# Patient Record
Sex: FEMALE | Race: WHITE | NOT HISPANIC OR LATINO | Employment: OTHER | URBAN - METROPOLITAN AREA
[De-identification: names, ages, dates, MRNs, and addresses within clinical notes are randomized per-mention and may not be internally consistent; named-entity substitution may affect disease eponyms.]

---

## 2019-09-23 ENCOUNTER — TELEPHONE (OUTPATIENT)
Dept: BARIATRICS/WEIGHT MGMT | Facility: CLINIC | Age: 69
End: 2019-09-23

## 2019-09-23 NOTE — TELEPHONE ENCOUNTER
BBSA records printed and placed in your inbox. I am awaiting the ER report to be faxed over from [facility]. [facility] for pt to return my call.

## 2019-09-23 NOTE — TELEPHONE ENCOUNTER
----- Message from Cara Petersen MA sent at 9/23/2019  2:59 PM EDT -----    Pt went to the ER and states that her band has slipped. Darcy says first available is in two days does the the pt need to be sooner. I am requesting the ER records.

## 2019-09-25 NOTE — TELEPHONE ENCOUNTER
Pt returned my call notified to schedule a f/up visit when able. Pt was transferred to [facility] to be placed on the schedule.

## 2019-09-25 NOTE — TELEPHONE ENCOUNTER
"Records reviewed:     70 y/o female s/p LAGB APS 12/2008 by Dr. Evans. Band unfilled 11/2013 for N/V/port pain.  Last UGI 11/2015 revealed esophageal dilatation and dysmotility w/ patent lapband channel.  Hx esophageal diverticulum noted, followed by GI (Dr. Enrique, [facility]).  LOV w/ BBSA 3/2016 - band remained empty, asymptomatic.    ER eval - [facility] - 9/21/19.  c/o several day hx lower abd pain w/ N/V/D, concern for possible recurrent diverticulitis.  Suprapubic TTP noted on exam.  AVSS.  Labs/UA (-).  CT ab/pel IV only contrast - \"gastrostomy tube\" is present, no acute findings.  Bentyl RX given.  Liquid diet and f/up w/ surgeon advised.        "

## 2019-10-28 ENCOUNTER — OFFICE VISIT (OUTPATIENT)
Dept: BARIATRICS/WEIGHT MGMT | Facility: CLINIC | Age: 69
End: 2019-10-28

## 2019-10-28 VITALS
TEMPERATURE: 97.2 F | HEIGHT: 60 IN | SYSTOLIC BLOOD PRESSURE: 118 MMHG | OXYGEN SATURATION: 99 % | WEIGHT: 188.5 LBS | RESPIRATION RATE: 18 BRPM | DIASTOLIC BLOOD PRESSURE: 70 MMHG | BODY MASS INDEX: 37.01 KG/M2 | HEART RATE: 82 BPM

## 2019-10-28 DIAGNOSIS — R13.10 DYSPHAGIA, UNSPECIFIED TYPE: ICD-10-CM

## 2019-10-28 DIAGNOSIS — R10.13 DYSPEPSIA: Primary | ICD-10-CM

## 2019-10-28 DIAGNOSIS — K21.9 GASTROESOPHAGEAL REFLUX DISEASE, ESOPHAGITIS PRESENCE NOT SPECIFIED: ICD-10-CM

## 2019-10-28 DIAGNOSIS — G89.29 CHRONIC ABDOMINAL PAIN: ICD-10-CM

## 2019-10-28 DIAGNOSIS — R10.9 CHRONIC ABDOMINAL PAIN: ICD-10-CM

## 2019-10-28 PROCEDURE — 99204 OFFICE O/P NEW MOD 45 MIN: CPT | Performed by: PHYSICIAN ASSISTANT

## 2019-10-28 RX ORDER — SUCRALFATE 1 G/1
1 TABLET ORAL NIGHTLY
COMMUNITY

## 2019-10-28 RX ORDER — LEVOTHYROXINE SODIUM 0.1 MG/1
100 TABLET ORAL DAILY
COMMUNITY
Start: 2019-09-18

## 2019-10-28 RX ORDER — LEVETIRACETAM 500 MG/1
TABLET ORAL
COMMUNITY
Start: 2019-08-15 | End: 2020-06-30 | Stop reason: DRUGHIGH

## 2019-10-28 RX ORDER — FLUOXETINE HYDROCHLORIDE 40 MG/1
CAPSULE ORAL
COMMUNITY
Start: 2019-09-30 | End: 2020-06-30

## 2019-10-28 RX ORDER — LISINOPRIL AND HYDROCHLOROTHIAZIDE 12.5; 1 MG/1; MG/1
1 TABLET ORAL NIGHTLY
COMMUNITY
Start: 2019-08-22

## 2019-10-28 RX ORDER — PANTOPRAZOLE SODIUM 40 MG/1
40 TABLET, DELAYED RELEASE ORAL DAILY
COMMUNITY
End: 2020-06-30

## 2019-10-28 RX ORDER — EMPAGLIFLOZIN 25 MG/1
25 TABLET, FILM COATED ORAL DAILY
COMMUNITY
Start: 2019-09-30

## 2019-10-28 RX ORDER — CYANOCOBALAMIN 1000 UG/ML
1000 INJECTION, SOLUTION INTRAMUSCULAR; SUBCUTANEOUS
COMMUNITY
Start: 2019-09-18

## 2019-10-28 RX ORDER — GABAPENTIN 800 MG/1
800 TABLET ORAL 2 TIMES DAILY
COMMUNITY
Start: 2019-10-10

## 2019-10-28 RX ORDER — SIMVASTATIN 40 MG
40 TABLET ORAL NIGHTLY
COMMUNITY
Start: 2019-08-22

## 2019-10-28 RX ORDER — METHOCARBAMOL 750 MG/1
750 TABLET, FILM COATED ORAL 3 TIMES DAILY PRN
COMMUNITY
Start: 2019-10-10

## 2019-10-28 RX ORDER — OXYCODONE AND ACETAMINOPHEN 10; 325 MG/1; MG/1
1 TABLET ORAL EVERY 6 HOURS PRN
COMMUNITY
Start: 2019-10-10

## 2019-10-28 NOTE — PROGRESS NOTES
"[facility] Bariatric Surgery  [address]  [facility] [id number] [phone]        Patient Name:  Jazmín Adams.  :  1950      Date of Visit: 10/28/2019      Reason for Visit:  AGB followup    HPI:  Jazmín Adams is a 69 y.o. female s/p LAGB APS 2008 by Dr. Evans.     Aware Dr. Evans is in [place], prefers to continue care here in [place].      Chart reviewed:  Band unfilled 2013 for N/V/port pain.  Last UGI 2015 revealed esophageal dilatation and dysmotility w/ patent lapband channel.  Hx esophageal diverticulum noted, followed by GI (Dr. Enrique, [facility]).  LOV w/ BBSA 3/2016 - band remained empty, asymptomatic.    Patient called our office 19 stating she had gone to the ER and was told her band had slipped.  Records requested/reviewed.  ER eval @[facility]  19 - c/o several day hx lower abd pain w/ N/V/D, concern for possible recurrent diverticulitis.  Suprapubic TTP noted on exam.  AVSS.  Labs/UA (-).  CT ab/pel IV only contrast - \"gastrostomy tube\" is present, no acute findings.  Bentyl RX given.  Liquid diet and f/up w/ surgeon advised.  Patient did not keep scheduled OV.       Presents today w/ ongoing issues.  Wants to discuss having lapband removed w/ possible revision.  Has had chronic dysphagia w/ frequent N/V.  Postprandial LUQ pain w/ associated port pain.  Uncontrolled reflux, throat \"burns\" all day long, despite taking Protonix daily w/ Carafate qPM.  Grandmother and 2 uncles have had throat cancer which really worries her.       Past Medical History:   Diagnosis Date   • Anxiety and depression    • Chronic back pain    • Dyspepsia    • Esophageal diverticulum     followed by GI ([facility])   • Fatigue    • Hyperlipidemia    • Hypertension    • Hypothyroidism    • IBS (irritable bowel syndrome)    • Obesity    • RLS (restless legs syndrome)    • Type 2 diabetes mellitus (CMS/HCC)      Past Surgical " "History:   Procedure Laterality Date   • BLADDER SUSPENSION      x2   • CHOLECYSTECTOMY OPEN     • LAPAROSCOPIC GASTRIC BANDING  2008    s/p LAGB APS 12/2008 w/ Dr. Evans   • LAPAROSCOPIC HYSTERECTOMY     • TUBAL ABDOMINAL LIGATION         Allergies   Allergen Reactions   • Sulfa Antibiotics Palpitations       Outpatient Medications Marked as Taking for the 10/28/19 encounter (Office Visit) with Jacklyn Putnam PA   Medication Sig Dispense Refill   • cyanocobalamin 1000 MCG/ML injection      • diclofenac (VOLTAREN) 1 % gel gel      • FLUoxetine (PROzac) 40 MG capsule      • gabapentin (NEURONTIN) 800 MG tablet      • JARDIANCE 25 MG tablet      • levETIRAcetam (KEPPRA) 500 MG tablet      • levothyroxine (SYNTHROID, LEVOTHROID) 100 MCG tablet      • lisinopril-hydrochlorothiazide (PRINZIDE,ZESTORETIC) 10-12.5 MG per tablet      • metFORMIN (GLUCOPHAGE) 500 MG tablet Take 500 mg by mouth 2 (Two) Times a Day With Meals.     • methocarbamol (ROBAXIN) 750 MG tablet      • oxyCODONE-acetaminophen (PERCOCET)  MG per tablet      • pantoprazole (PROTONIX) 40 MG EC tablet Take 40 mg by mouth Daily.     • simvastatin (ZOCOR) 40 MG tablet      • sucralfate (CARAFATE) 1 g tablet Take 1 g by mouth Every Night.         Social History     Socioeconomic History   • Marital status:      Spouse name: Not on file   • Number of children: Not on file   • Years of education: Not on file   • Highest education level: Not on file         /70 (BP Location: Left arm, Patient Position: Sitting, Cuff Size: Large Adult)   Pulse 82   Temp 97.2 °F (36.2 °C) (Temporal)   Resp 18   Ht 152.4 cm (60\")   Wt 85.5 kg (188 lb 8 oz)   SpO2 99%   BMI 36.81 kg/m²     Physical Exam   Constitutional: She appears well-developed and well-nourished. She is cooperative.   HENT:   Mouth/Throat: Oropharynx is clear and moist and mucous membranes are normal.   Eyes: Conjunctivae are normal. No scleral icterus.   Cardiovascular: Normal " rate.   Pulmonary/Chest: Effort normal.   Abdominal: Soft. There is no tenderness.   LUQ port - site unremarkable   Musculoskeletal: Normal range of motion. She exhibits no edema.   Neurological: She is alert.   Skin: Skin is warm and dry. No rash noted.   Psychiatric: She has a normal mood and affect. Judgment normal.       Assessment: s/p LAGB APS 12/2008 by Dr. Evans    ICD-10-CM ICD-9-CM   1. Dyspepsia R10.13 536.8   2. Dysphagia, unspecified type R13.10 787.20   3. Gastroesophageal reflux disease, esophagitis presence not specified K21.9 530.81   4. Chronic abdominal pain R10.9 789.00    G89.29 338.29       Plan:  UGI ordered to further evaluate.  Will request most recent EGD w/ Dr. Enrique.  Further input to follow.     Addendum:  Last EGD 7/16/18 w/ Dr. Enrique @ [facility] - mild esophagitis and 2 small antral ulcerations noted, bx negative for H.Pylori.         ANGELA Avila

## 2020-01-10 ENCOUNTER — HOSPITAL ENCOUNTER (OUTPATIENT)
Dept: GENERAL RADIOLOGY | Facility: HOSPITAL | Age: 70
Discharge: HOME | End: 2020-01-10
Admitting: PHYSICIAN ASSISTANT

## 2020-01-10 DIAGNOSIS — R13.10 DYSPHAGIA, UNSPECIFIED TYPE: ICD-10-CM

## 2020-01-10 DIAGNOSIS — K21.9 GASTROESOPHAGEAL REFLUX DISEASE, ESOPHAGITIS PRESENCE NOT SPECIFIED: ICD-10-CM

## 2020-01-10 DIAGNOSIS — R10.13 DYSPEPSIA: ICD-10-CM

## 2020-01-10 PROCEDURE — 63710000001 BARIUM SULFATE 96 % RECONSTITUTED SUSPENSION: Performed by: PHYSICIAN ASSISTANT

## 2020-01-10 PROCEDURE — 74240 X-RAY XM UPR GI TRC 1CNTRST: CPT

## 2020-01-10 PROCEDURE — A9270 NON-COVERED ITEM OR SERVICE: HCPCS | Performed by: PHYSICIAN ASSISTANT

## 2020-01-10 RX ADMIN — BARIUM SULFATE 183 ML: 960 POWDER, FOR SUSPENSION ORAL at 09:57

## 2020-03-05 ENCOUNTER — OFFICE VISIT (OUTPATIENT)
Dept: BARIATRICS/WEIGHT MGMT | Facility: CLINIC | Age: 70
End: 2020-03-05

## 2020-03-05 VITALS
DIASTOLIC BLOOD PRESSURE: 88 MMHG | HEART RATE: 97 BPM | TEMPERATURE: 96.4 F | SYSTOLIC BLOOD PRESSURE: 144 MMHG | WEIGHT: 193.5 LBS | HEIGHT: 60 IN | RESPIRATION RATE: 18 BRPM | BODY MASS INDEX: 37.99 KG/M2 | OXYGEN SATURATION: 97 %

## 2020-03-05 DIAGNOSIS — K21.9 GASTROESOPHAGEAL REFLUX DISEASE, ESOPHAGITIS PRESENCE NOT SPECIFIED: Primary | ICD-10-CM

## 2020-03-05 DIAGNOSIS — K95.09 COMPLICATION OF GASTRIC BAND PROCEDURE: ICD-10-CM

## 2020-03-05 DIAGNOSIS — R13.10 DYSPHAGIA, UNSPECIFIED TYPE: ICD-10-CM

## 2020-03-05 PROCEDURE — 99214 OFFICE O/P EST MOD 30 MIN: CPT | Performed by: SURGERY

## 2020-03-05 NOTE — PROGRESS NOTES
"[facility] Bariatric Surgery  [address]  [address]  [facility] [id number] [phone]        Patient Name: Jazmín Adams.  YOB: 1950      Date of Visit: 03/05/2020      Reason for Visit:  Dysphagia, intolerance of adjustable gastric band    HPI:  Jazmín Adams is a 69 y.o. female s/p \" LAGB APS 12/2008 by Dr. Evans.     Aware Dr. Evans is in [place], prefers to continue care here in [place].      Chart reviewed:  Band unfilled 11/2013 for N/V/port pain.  Last UGI 11/2015 revealed esophageal dilatation and dysmotility w/ patent lapband channel.  Hx esophageal diverticulum noted, followed by GI (Dr. Enrique, [facility]).  LOV w/ BBSA 3/2016 - band remained empty, asymptomatic.    Patient called our office 9/23/19 stating she had gone to the ER and was told her band had slipped.  Records requested/reviewed.  ER eval @[facility]  9/21/19 - c/o several day hx lower abd pain w/ N/V/D, concern for possible recurrent diverticulitis.  Suprapubic TTP noted on exam.  AVSS.  Labs/UA (-).  CT ab/pel IV only contrast - \"gastrostomy tube\" is present, no acute findings.  Bentyl RX given.  Liquid diet and f/up w/ surgeon advised.  Patient did not keep scheduled OV.       Presents today w/ ongoing issues.  Wants to discuss having lapband removed w/ possible revision.  Has had chronic dysphagia w/ frequent N/V.  Postprandial LUQ pain w/ associated port pain.  Uncontrolled reflux, throat \"burns\" all day long, despite taking Protonix daily w/ Carafate qPM.  Grandmother and 2 uncles have had throat cancer which really worries her. \"    Today's update:    10/28/19 UGI:  IMPRESSION:  1. Status post gastric lap band. LAP-BAND appears to be in the  appropriate position and orientation. LAP-BAND channel is narrow, and  patent  2. Small sized Zenker's diverticulum  3. Small sized esophageal diverticulum on the rightward aspect of the  midesophagus  4. Large sized duodenal diverticulum " with a large intraluminal filling  defect. Further evaluation may be considered if clinically relevant.         Images reviewed by me.  Patent band channel.  Appropriate band angle.  Lateral pouch enlargement, and esophageal ectasia, indicating band has been too tight for a long period of time.    C/o acid reflux.  +Dysphagia, and hoarse voice which she attributes to acid reflux.  She is on pantoprazole which somewhat helps her symptoms.  Was off PPI for a month due to insurance issues. +port tenderness    Had EGD with Dr. Enrique 3/2/20: findings of erosive esophagitis at GE junction, erosive gastritis at antrum.  Lap band outline visible, but no erosion seen in th  retroflexed view of the pictures she brought me.      She has gained some weight, and attributes this to eating bread and crackers to try and soothe the acid reflux.  She is interested in revision options if possible.        Past Medical History:   Diagnosis Date   • Anxiety and depression    • Chronic back pain    • Dyspepsia    • Esophageal diverticulum     followed by GI (Dr. Enrique, [facility])   • Fatigue    • Hyperlipidemia    • Hypertension    • Hypothyroidism    • IBS (irritable bowel syndrome)    • Obesity    • RLS (restless legs syndrome)    • Type 2 diabetes mellitus (CMS/HCC)      Past Surgical History:   Procedure Laterality Date   • BLADDER SUSPENSION      x2   • CHOLECYSTECTOMY OPEN     • LAPAROSCOPIC GASTRIC BANDING  2008    s/p LAGB APS 12/2008 w/ Dr. Evans   • LAPAROSCOPIC HYSTERECTOMY     • TUBAL ABDOMINAL LIGATION       Outpatient Medications Marked as Taking for the 3/5/20 encounter (Office Visit) with Marixa Pavon MD   Medication Sig Dispense Refill   • cyanocobalamin 1000 MCG/ML injection      • diclofenac (VOLTAREN) 1 % gel gel      • FLUoxetine (PROzac) 40 MG capsule      • gabapentin (NEURONTIN) 800 MG tablet      • JARDIANCE 25 MG tablet      • levETIRAcetam (KEPPRA) 500 MG tablet      • levothyroxine  (SYNTHROID, LEVOTHROID) 100 MCG tablet      • lisinopril-hydrochlorothiazide (PRINZIDE,ZESTORETIC) 10-12.5 MG per tablet      • metFORMIN (GLUCOPHAGE) 500 MG tablet Take 500 mg by mouth 2 (Two) Times a Day With Meals.     • methocarbamol (ROBAXIN) 750 MG tablet      • oxyCODONE-acetaminophen (PERCOCET)  MG per tablet      • pantoprazole (PROTONIX) 40 MG EC tablet Take 40 mg by mouth Daily.     • simvastatin (ZOCOR) 40 MG tablet      • sucralfate (CARAFATE) 1 g tablet Take 1 g by mouth Every Night.       Allergies   Allergen Reactions   • Sulfa Antibiotics Palpitations       Social History     Socioeconomic History   • Marital status:      Spouse name: Not on file   • Number of children: Not on file   • Years of education: Not on file   • Highest education level: Not on file       Vitals:    03/05/20 1253   BP: 144/88   Pulse: 97   Resp: 18   Temp: 96.4 °F (35.8 °C)   SpO2: 97%     Weight 87.8 kg (193 lb 8 oz)  Body mass index is 37.79 kg/m².    Physical Exam   Constitutional: She is oriented to person, place, and time. She appears well-developed and well-nourished. No distress.   HENT:   Head: Normocephalic and atraumatic.   Mouth/Throat: No oropharyngeal exudate.   Eyes: Pupils are equal, round, and reactive to light. Conjunctivae and EOM are normal.   Pulmonary/Chest: Effort normal. No respiratory distress.   Abdominal: Soft. She exhibits no distension.       Neurological: She is alert and oriented to person, place, and time. No cranial nerve deficit.   Skin: Skin is warm and dry. She is not diaphoretic. No pallor.   Psychiatric: She has a normal mood and affect. Her behavior is normal. Thought content normal.         Assessment:      ICD-10-CM ICD-9-CM   1. Gastroesophageal reflux disease, esophagitis presence not specified K21.9 530.81   2. Dysphagia, unspecified type R13.10 787.20   3. Complication of gastric band procedure K95.09 539.09       Plan:  Laparoscopic Lapband Removal w/ Dr. Martinez.   Avoid ASA/NSAIDS x 1 week prior.  Potential risk of bleeding, infection, injury to surrounding structures, pulm complications, venothromboembolic events, anesthesia reaction discussed with patient.  All questions answered - wishes to proceed.     Will need cardiac clearance prior to procedure given age, hx of HTN/HLD/DM2, and procedure must be done at [facility], not [facility], given comorbidities and higher ASA class.    ADDENDUM: Cardiac clearance received.  Will schedule case.

## 2020-03-09 ENCOUNTER — TELEPHONE (OUTPATIENT)
Dept: BARIATRICS/WEIGHT MGMT | Facility: CLINIC | Age: 70
End: 2020-03-09

## 2020-03-09 RX ORDER — SODIUM CHLORIDE 9 MG/ML
150 INJECTION, SOLUTION INTRAVENOUS CONTINUOUS
Status: CANCELLED | OUTPATIENT
Start: 2020-03-09

## 2020-03-09 NOTE — TELEPHONE ENCOUNTER
----- Message from Marixa Pavon MD sent at 3/9/2020 10:31 AM EDT -----  Please let her know Dr. Martinez will take out the band but I needs cardiology clearance to be sent to us first.  Does she need referral to  cards or does she already have one?  I'll wait to schedule procedure until I see the cards clearance.      ----- Message -----  From: Torsten Martinez MD  Sent: 3/5/2020   6:38 PM EDT  To: Marixa Pavon MD    OK to sched AGBR without repeat EGD, thanks!  ----- Message -----  From: Marixa Pavon MD  Sent: 3/5/2020   4:32 PM EST  To: Torsten Martinez MD    LAGB APS 12/2008 by Dr. Evans.      Has chronic dysphagia, port pain, GERD, n/v.        Had EGD with Dr. Enrique in [place] on 3/2/20: findings of erosive esophagitis at GE junction, erosive gastritis at antrum.  Lap band outline visible, but no erosion seen in the retroflexed view on the pictures she brought me.          10/28/19 UGI:  IMPRESSION:  1. Status post gastric lap band. LAP-BAND appears to be in the  appropriate position and orientation. LAP-BAND channel is narrow, and  patent  2. Small sized Zenker's diverticulum  3. Small sized esophageal diverticulum on the rightward aspect of the  midesophagus  4. Large sized duodenal diverticulum with a large intraluminal filling  defect. Further evaluation may be considered if clinically relevant.         Images reviewed by me.  Patent band channel.  Appropriate band angle.  Lateral pouch enlargement, and esophageal ectasia.        Desires band removal, possibly later sleeve.      Can I schedule for band removal (would do at main OR, and I'm getting cards clearance first b/c of age and comorbidities)    OR     Do you want to scope her yourself?

## 2020-03-09 NOTE — TELEPHONE ENCOUNTER
Contacted pt to let her know that Dr. Martinez will remove her Lapband, but she will need cardic clearance prior to surgery. Pt verbalized understanding. I asked pt if she needs a referral to [facility] or does she already have a cardiologist? Pt stated that she has a cardiologist already, and will have a clearance faxed to our office. I provided pt with our fax number. I let pt know that once we receive clearance and insurance approval, we will schedule her procedure.     Dr. Pavon,  Please refer message back to [facility] for insurance approval for band removal w/ the need of pts Cardiac clearance, which has been requested.      Thank you,  Gale

## 2020-03-11 PROBLEM — K21.9 GASTROESOPHAGEAL REFLUX DISEASE: Status: ACTIVE | Noted: 2020-03-11

## 2020-03-11 PROBLEM — K95.09 COMPLICATION OF GASTRIC BAND PROCEDURE: Status: ACTIVE | Noted: 2020-03-11

## 2020-03-11 PROBLEM — R13.10 DYSPHAGIA: Status: ACTIVE | Noted: 2020-03-11

## 2020-04-02 ENCOUNTER — APPOINTMENT (OUTPATIENT)
Dept: PREADMISSION TESTING | Facility: HOSPITAL | Age: 70
End: 2020-04-02

## 2020-06-30 ENCOUNTER — OFFICE VISIT (OUTPATIENT)
Dept: BARIATRICS/WEIGHT MGMT | Facility: CLINIC | Age: 70
End: 2020-06-30

## 2020-06-30 ENCOUNTER — APPOINTMENT (OUTPATIENT)
Dept: PREADMISSION TESTING | Facility: HOSPITAL | Age: 70
End: 2020-06-30

## 2020-06-30 VITALS
HEIGHT: 60 IN | WEIGHT: 193.5 LBS | RESPIRATION RATE: 14 BRPM | BODY MASS INDEX: 37.99 KG/M2 | SYSTOLIC BLOOD PRESSURE: 142 MMHG | DIASTOLIC BLOOD PRESSURE: 80 MMHG | TEMPERATURE: 96.9 F | HEART RATE: 77 BPM

## 2020-06-30 DIAGNOSIS — Z98.84 STATUS POST BARIATRIC SURGERY: ICD-10-CM

## 2020-06-30 DIAGNOSIS — R13.10 DYSPHAGIA, UNSPECIFIED TYPE: ICD-10-CM

## 2020-06-30 DIAGNOSIS — R10.9 ABDOMINAL PAIN, UNSPECIFIED ABDOMINAL LOCATION: ICD-10-CM

## 2020-06-30 DIAGNOSIS — R11.10 VOMITING, INTRACTABILITY OF VOMITING NOT SPECIFIED, PRESENCE OF NAUSEA NOT SPECIFIED, UNSPECIFIED VOMITING TYPE: ICD-10-CM

## 2020-06-30 DIAGNOSIS — R11.0 NAUSEA: ICD-10-CM

## 2020-06-30 DIAGNOSIS — E66.9 OBESITY, CLASS II, BMI 35-39.9: Primary | ICD-10-CM

## 2020-06-30 LAB
DEPRECATED RDW RBC AUTO: 46.4 FL (ref 37–54)
ERYTHROCYTE [DISTWIDTH] IN BLOOD BY AUTOMATED COUNT: 13 % (ref 12.3–15.4)
HCT VFR BLD AUTO: 40.1 % (ref 34–46.6)
HGB BLD-MCNC: 13.1 G/DL (ref 12–15.9)
MCH RBC QN AUTO: 31.9 PG (ref 26.6–33)
MCHC RBC AUTO-ENTMCNC: 32.7 G/DL (ref 31.5–35.7)
MCV RBC AUTO: 97.6 FL (ref 79–97)
PLATELET # BLD AUTO: 239 10*3/MM3 (ref 140–450)
PMV BLD AUTO: 10.1 FL (ref 6–12)
POTASSIUM SERPL-SCNC: 4.2 MMOL/L (ref 3.5–5.2)
RBC # BLD AUTO: 4.11 10*6/MM3 (ref 3.77–5.28)
WBC # BLD AUTO: 7.36 10*3/MM3 (ref 3.4–10.8)

## 2020-06-30 PROCEDURE — 84132 ASSAY OF SERUM POTASSIUM: CPT | Performed by: SURGERY

## 2020-06-30 PROCEDURE — 36415 COLL VENOUS BLD VENIPUNCTURE: CPT

## 2020-06-30 PROCEDURE — C9803 HOPD COVID-19 SPEC COLLECT: HCPCS

## 2020-06-30 PROCEDURE — 99214 OFFICE O/P EST MOD 30 MIN: CPT | Performed by: PHYSICIAN ASSISTANT

## 2020-06-30 PROCEDURE — 93010 ELECTROCARDIOGRAM REPORT: CPT | Performed by: INTERNAL MEDICINE

## 2020-06-30 PROCEDURE — U0004 COV-19 TEST NON-CDC HGH THRU: HCPCS

## 2020-06-30 PROCEDURE — 85027 COMPLETE CBC AUTOMATED: CPT | Performed by: SURGERY

## 2020-06-30 PROCEDURE — U0002 COVID-19 LAB TEST NON-CDC: HCPCS

## 2020-06-30 PROCEDURE — 93005 ELECTROCARDIOGRAM TRACING: CPT

## 2020-06-30 RX ORDER — ALBUTEROL SULFATE 90 UG/1
2 AEROSOL, METERED RESPIRATORY (INHALATION) EVERY 4 HOURS PRN
COMMUNITY

## 2020-06-30 RX ORDER — LEVETIRACETAM 750 MG/1
750 TABLET ORAL 2 TIMES DAILY
COMMUNITY
Start: 2020-04-06
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
## 2020-07-01 LAB
REF LAB TEST METHOD: NORMAL
SARS-COV-2 RNA RESP QL NAA+PROBE: NOT DETECTED

## 2020-07-02 ENCOUNTER — ANESTHESIA EVENT (OUTPATIENT)
Dept: PERIOP | Facility: HOSPITAL | Age: 70
End: 2020-07-02

## 2020-07-02 ENCOUNTER — HOSPITAL ENCOUNTER (OUTPATIENT)
Facility: HOSPITAL | Age: 70
Discharge: HOME | End: 2020-07-02
Attending: SURGERY | Admitting: SURGERY

## 2020-07-02 ENCOUNTER — ANESTHESIA (OUTPATIENT)
Dept: PERIOP | Facility: HOSPITAL | Age: 70
End: 2020-07-02

## 2020-07-02 VITALS
RESPIRATION RATE: 18 BRPM | OXYGEN SATURATION: 96 % | TEMPERATURE: 98 F | SYSTOLIC BLOOD PRESSURE: 128 MMHG | HEART RATE: 66 BPM | DIASTOLIC BLOOD PRESSURE: 55 MMHG

## 2020-07-02 DIAGNOSIS — K95.09 COMPLICATION OF GASTRIC BAND PROCEDURE: ICD-10-CM

## 2020-07-02 DIAGNOSIS — K21.9 GASTROESOPHAGEAL REFLUX DISEASE, ESOPHAGITIS PRESENCE NOT SPECIFIED: ICD-10-CM

## 2020-07-02 DIAGNOSIS — R13.10 DYSPHAGIA, UNSPECIFIED TYPE: ICD-10-CM

## 2020-07-02 LAB
GLUCOSE BLDC GLUCOMTR-MCNC: 147 MG/DL (ref 70–130)
GLUCOSE BLDC GLUCOMTR-MCNC: 217 MG/DL (ref 70–130)

## 2020-07-02 PROCEDURE — 88313 SPECIAL STAINS GROUP 2: CPT | Performed by: SURGERY

## 2020-07-02 PROCEDURE — 25010000002 HYDROMORPHONE PER 4 MG: Performed by: NURSE ANESTHETIST, CERTIFIED REGISTERED

## 2020-07-02 PROCEDURE — 25010000002 ENOXAPARIN PER 10 MG: Performed by: SURGERY

## 2020-07-02 PROCEDURE — 25010000002 FENTANYL CITRATE (PF) 100 MCG/2ML SOLUTION: Performed by: NURSE ANESTHETIST, CERTIFIED REGISTERED

## 2020-07-02 PROCEDURE — A9270 NON-COVERED ITEM OR SERVICE: HCPCS | Performed by: SURGERY

## 2020-07-02 PROCEDURE — 25010000002 NEOSTIGMINE 10 MG/10ML SOLUTION: Performed by: NURSE ANESTHETIST, CERTIFIED REGISTERED

## 2020-07-02 PROCEDURE — 25010000002 PROPOFOL 10 MG/ML EMULSION: Performed by: NURSE ANESTHETIST, CERTIFIED REGISTERED

## 2020-07-02 PROCEDURE — 82962 GLUCOSE BLOOD TEST: CPT

## 2020-07-02 PROCEDURE — 25010000003 CEFAZOLIN IN DEXTROSE 2-4 GM/100ML-% SOLUTION: Performed by: SURGERY

## 2020-07-02 PROCEDURE — 47001 NDL BIOPSY LVR TM OTH MAJ PX: CPT | Performed by: SURGERY

## 2020-07-02 PROCEDURE — 25010000002 DEXAMETHASONE PER 1 MG: Performed by: NURSE ANESTHETIST, CERTIFIED REGISTERED

## 2020-07-02 PROCEDURE — 25010000002 ONDANSETRON PER 1 MG: Performed by: NURSE ANESTHETIST, CERTIFIED REGISTERED

## 2020-07-02 PROCEDURE — 88307 TISSUE EXAM BY PATHOLOGIST: CPT | Performed by: SURGERY

## 2020-07-02 PROCEDURE — 88300 SURGICAL PATH GROSS: CPT | Performed by: SURGERY

## 2020-07-02 PROCEDURE — 43774 LAP RMVL GASTR ADJ ALL PARTS: CPT | Performed by: SURGERY

## 2020-07-02 PROCEDURE — 63710000001 SIMETHICONE 80 MG CHEWABLE TABLET: Performed by: SURGERY

## 2020-07-02 PROCEDURE — 25010000002 PROMETHAZINE PER 50 MG: Performed by: NURSE ANESTHETIST, CERTIFIED REGISTERED

## 2020-07-02 RX ORDER — SODIUM CHLORIDE 9 MG/ML
INJECTION, SOLUTION INTRAVENOUS AS NEEDED
Status: DISCONTINUED | OUTPATIENT
Start: 2020-07-02 | End: 2020-07-02 | Stop reason: HOSPADM

## 2020-07-02 RX ORDER — SIMETHICONE 80 MG
80 TABLET,CHEWABLE ORAL ONCE
Status: COMPLETED | OUTPATIENT
Start: 2020-07-02 | End: 2020-07-02

## 2020-07-02 RX ORDER — HYDROMORPHONE HYDROCHLORIDE 1 MG/ML
0.5 INJECTION, SOLUTION INTRAMUSCULAR; INTRAVENOUS; SUBCUTANEOUS
Status: DISCONTINUED | OUTPATIENT
Start: 2020-07-02 | End: 2020-07-02 | Stop reason: HOSPADM

## 2020-07-02 RX ORDER — SODIUM CHLORIDE 0.9 % (FLUSH) 0.9 %
10 SYRINGE (ML) INJECTION EVERY 12 HOURS SCHEDULED
Status: DISCONTINUED | OUTPATIENT
Start: 2020-07-02 | End: 2020-07-02 | Stop reason: HOSPADM

## 2020-07-02 RX ORDER — DEXAMETHASONE SODIUM PHOSPHATE 4 MG/ML
INJECTION, SOLUTION INTRA-ARTICULAR; INTRALESIONAL; INTRAMUSCULAR; INTRAVENOUS; SOFT TISSUE AS NEEDED
Status: DISCONTINUED | OUTPATIENT
Start: 2020-07-02 | End: 2020-07-02 | Stop reason: SURG

## 2020-07-02 RX ORDER — FENTANYL CITRATE 50 UG/ML
50 INJECTION, SOLUTION INTRAMUSCULAR; INTRAVENOUS
Status: DISCONTINUED | OUTPATIENT
Start: 2020-07-02 | End: 2020-07-02 | Stop reason: HOSPADM

## 2020-07-02 RX ORDER — SODIUM CHLORIDE 9 MG/ML
150 INJECTION, SOLUTION INTRAVENOUS CONTINUOUS
Status: DISCONTINUED | OUTPATIENT
Start: 2020-07-02 | End: 2020-07-02 | Stop reason: HOSPADM

## 2020-07-02 RX ORDER — CEFAZOLIN SODIUM 2 G/100ML
2 INJECTION, SOLUTION INTRAVENOUS ONCE
Status: COMPLETED | OUTPATIENT
Start: 2020-07-02 | End: 2020-07-02

## 2020-07-02 RX ORDER — ONDANSETRON 2 MG/ML
4 INJECTION INTRAMUSCULAR; INTRAVENOUS ONCE
Status: COMPLETED | OUTPATIENT
Start: 2020-07-02 | End: 2020-07-02

## 2020-07-02 RX ORDER — NEOSTIGMINE METHYLSULFATE 1 MG/ML
INJECTION, SOLUTION INTRAVENOUS AS NEEDED
Status: DISCONTINUED | OUTPATIENT
Start: 2020-07-02 | End: 2020-07-02 | Stop reason: SURG

## 2020-07-02 RX ORDER — BUPIVACAINE HYDROCHLORIDE AND EPINEPHRINE 5; 5 MG/ML; UG/ML
INJECTION, SOLUTION EPIDURAL; INTRACAUDAL; PERINEURAL AS NEEDED
Status: DISCONTINUED | OUTPATIENT
Start: 2020-07-02 | End: 2020-07-02 | Stop reason: HOSPADM

## 2020-07-02 RX ORDER — ROCURONIUM BROMIDE 10 MG/ML
INJECTION, SOLUTION INTRAVENOUS AS NEEDED
Status: DISCONTINUED | OUTPATIENT
Start: 2020-07-02 | End: 2020-07-02 | Stop reason: SURG

## 2020-07-02 RX ORDER — PROMETHAZINE HYDROCHLORIDE 25 MG/ML
12.5 INJECTION, SOLUTION INTRAMUSCULAR; INTRAVENOUS
Status: DISCONTINUED | OUTPATIENT
Start: 2020-07-02 | End: 2020-07-02 | Stop reason: HOSPADM

## 2020-07-02 RX ORDER — SODIUM CHLORIDE, SODIUM LACTATE, POTASSIUM CHLORIDE, CALCIUM CHLORIDE 600; 310; 30; 20 MG/100ML; MG/100ML; MG/100ML; MG/100ML
INJECTION, SOLUTION INTRAVENOUS CONTINUOUS PRN
Status: DISCONTINUED | OUTPATIENT
Start: 2020-07-02 | End: 2020-07-02 | Stop reason: SURG

## 2020-07-02 RX ORDER — SODIUM CHLORIDE 0.9 % (FLUSH) 0.9 %
10 SYRINGE (ML) INJECTION AS NEEDED
Status: DISCONTINUED | OUTPATIENT
Start: 2020-07-02 | End: 2020-07-02 | Stop reason: HOSPADM

## 2020-07-02 RX ORDER — SODIUM CHLORIDE, SODIUM LACTATE, POTASSIUM CHLORIDE, CALCIUM CHLORIDE 600; 310; 30; 20 MG/100ML; MG/100ML; MG/100ML; MG/100ML
9 INJECTION, SOLUTION INTRAVENOUS CONTINUOUS
Status: CANCELLED | OUTPATIENT
Start: 2020-07-02

## 2020-07-02 RX ORDER — FAMOTIDINE 20 MG/1
20 TABLET, FILM COATED ORAL ONCE
Status: COMPLETED | OUTPATIENT
Start: 2020-07-02 | End: 2020-07-02

## 2020-07-02 RX ORDER — HYDROCODONE BITARTRATE AND ACETAMINOPHEN 7.5; 325 MG/1; MG/1
1 TABLET ORAL EVERY 4 HOURS PRN
Qty: 6 TABLET | Refills: 0 | Status: SHIPPED | OUTPATIENT
Start: 2020-07-02 | End: 2020-07-12

## 2020-07-02 RX ORDER — GLYCOPYRROLATE 0.2 MG/ML
INJECTION INTRAMUSCULAR; INTRAVENOUS AS NEEDED
Status: DISCONTINUED | OUTPATIENT
Start: 2020-07-02 | End: 2020-07-02 | Stop reason: SURG

## 2020-07-02 RX ORDER — MAGNESIUM HYDROXIDE 1200 MG/15ML
LIQUID ORAL AS NEEDED
Status: DISCONTINUED | OUTPATIENT
Start: 2020-07-02 | End: 2020-07-02 | Stop reason: HOSPADM

## 2020-07-02 RX ORDER — LIDOCAINE HYDROCHLORIDE 10 MG/ML
INJECTION, SOLUTION EPIDURAL; INFILTRATION; INTRACAUDAL; PERINEURAL AS NEEDED
Status: DISCONTINUED | OUTPATIENT
Start: 2020-07-02 | End: 2020-07-02 | Stop reason: SURG

## 2020-07-02 RX ORDER — FENTANYL CITRATE 50 UG/ML
INJECTION, SOLUTION INTRAMUSCULAR; INTRAVENOUS AS NEEDED
Status: DISCONTINUED | OUTPATIENT
Start: 2020-07-02 | End: 2020-07-02 | Stop reason: SURG

## 2020-07-02 RX ORDER — ONDANSETRON 2 MG/ML
INJECTION INTRAMUSCULAR; INTRAVENOUS AS NEEDED
Status: DISCONTINUED | OUTPATIENT
Start: 2020-07-02 | End: 2020-07-02 | Stop reason: SURG

## 2020-07-02 RX ORDER — FAMOTIDINE 10 MG/ML
20 INJECTION, SOLUTION INTRAVENOUS ONCE
Status: CANCELLED | OUTPATIENT
Start: 2020-07-02 | End: 2020-07-02

## 2020-07-02 RX ORDER — LIDOCAINE HYDROCHLORIDE 10 MG/ML
0.5 INJECTION, SOLUTION EPIDURAL; INFILTRATION; INTRACAUDAL; PERINEURAL ONCE AS NEEDED
Status: COMPLETED | OUTPATIENT
Start: 2020-07-02 | End: 2020-07-02

## 2020-07-02 RX ORDER — PROPOFOL 10 MG/ML
VIAL (ML) INTRAVENOUS AS NEEDED
Status: DISCONTINUED | OUTPATIENT
Start: 2020-07-02 | End: 2020-07-02 | Stop reason: SURG

## 2020-07-02 RX ADMIN — FAMOTIDINE 20 MG: 20 TABLET, FILM COATED ORAL at 06:47

## 2020-07-02 RX ADMIN — ROCURONIUM BROMIDE 40 MG: 10 INJECTION INTRAVENOUS at 07:32

## 2020-07-02 RX ADMIN — PROPOFOL 150 MG: 10 INJECTION, EMULSION INTRAVENOUS at 07:32

## 2020-07-02 RX ADMIN — FENTANYL CITRATE 50 MCG: 50 INJECTION, SOLUTION INTRAMUSCULAR; INTRAVENOUS at 07:32

## 2020-07-02 RX ADMIN — SIMETHICONE CHEW TAB 80 MG 80 MG: 80 TABLET ORAL at 10:23

## 2020-07-02 RX ADMIN — SODIUM CHLORIDE, POTASSIUM CHLORIDE, SODIUM LACTATE AND CALCIUM CHLORIDE: 600; 310; 30; 20 INJECTION, SOLUTION INTRAVENOUS at 07:28

## 2020-07-02 RX ADMIN — NEOSTIGMINE 3 MG: 1 INJECTION INTRAVENOUS at 08:13

## 2020-07-02 RX ADMIN — FENTANYL CITRATE 50 MCG: 50 INJECTION, SOLUTION INTRAMUSCULAR; INTRAVENOUS at 09:25

## 2020-07-02 RX ADMIN — HYDROMORPHONE HYDROCHLORIDE 0.5 MG: 1 INJECTION, SOLUTION INTRAMUSCULAR; INTRAVENOUS; SUBCUTANEOUS at 08:58

## 2020-07-02 RX ADMIN — GLYCOPYRROLATE 0.4 MG: 0.2 INJECTION INTRAMUSCULAR; INTRAVENOUS at 08:13

## 2020-07-02 RX ADMIN — SODIUM CHLORIDE 150 ML/HR: 9 INJECTION, SOLUTION INTRAVENOUS at 06:46

## 2020-07-02 RX ADMIN — DEXAMETHASONE SODIUM PHOSPHATE 8 MG: 4 INJECTION, SOLUTION INTRAMUSCULAR; INTRAVENOUS at 07:40

## 2020-07-02 RX ADMIN — HYDROMORPHONE HYDROCHLORIDE 0.5 MG: 1 INJECTION, SOLUTION INTRAMUSCULAR; INTRAVENOUS; SUBCUTANEOUS at 08:40

## 2020-07-02 RX ADMIN — PROMETHAZINE HYDROCHLORIDE 6.25 MG: 25 INJECTION INTRAMUSCULAR; INTRAVENOUS at 09:01

## 2020-07-02 RX ADMIN — ONDANSETRON 4 MG: 2 INJECTION INTRAMUSCULAR; INTRAVENOUS at 08:08

## 2020-07-02 RX ADMIN — HYDROMORPHONE HYDROCHLORIDE 0.5 MG: 1 INJECTION, SOLUTION INTRAMUSCULAR; INTRAVENOUS; SUBCUTANEOUS at 09:02

## 2020-07-02 RX ADMIN — LIDOCAINE HYDROCHLORIDE 0.2 ML: 10 INJECTION, SOLUTION EPIDURAL; INFILTRATION; INTRACAUDAL; PERINEURAL at 06:46

## 2020-07-02 RX ADMIN — ONDANSETRON 4 MG: 2 INJECTION INTRAMUSCULAR; INTRAVENOUS at 08:40

## 2020-07-02 RX ADMIN — LIDOCAINE HYDROCHLORIDE 50 MG: 10 INJECTION, SOLUTION EPIDURAL; INFILTRATION; INTRACAUDAL; PERINEURAL at 07:32

## 2020-07-02 RX ADMIN — FENTANYL CITRATE 50 MCG: 50 INJECTION, SOLUTION INTRAMUSCULAR; INTRAVENOUS at 08:31

## 2020-07-02 RX ADMIN — CEFAZOLIN SODIUM 2 G: 2 INJECTION, SOLUTION INTRAVENOUS at 07:28

## 2020-07-02 NOTE — PLAN OF CARE
Problem: Patient Care Overview  Goal: Plan of Care Review  Flowsheets  Taken 7/2/2020 1049  Progress: improving  Outcome Summary: Pt alert and oriented. VSS, room air, voiding and tolerating diet. Lap sites dry and intact. Discharge home with daughter.  Taken 7/2/2020 1000  Plan of Care Reviewed With: patient

## 2020-07-02 NOTE — ANESTHESIA POSTPROCEDURE EVALUATION
Patient: Jazmín Adams    Procedure Summary     Date:  07/02/20 Room / Location:   [facility] 02 /  [facility]    Anesthesia Start:  0728 Anesthesia Stop:      Procedure:  GASTRIC BANDING REMOVAL LAPAROSCOPIC WITH LIVER BIOPSY (N/A Abdomen) Diagnosis:       Gastroesophageal reflux disease, esophagitis presence not specified      Dysphagia, unspecified type      Complication of gastric band procedure      (Gastroesophageal reflux disease, esophagitis presence not specified [K21.9])      (Dysphagia, unspecified type [R13.10])      (Complication of gastric band procedure [K95.09])    Surgeon:  Torsten Martinez MD Provider:  Lance Tom Jr., MD    Anesthesia Type:  general with block ASA Status:  3          Anesthesia Type: general with block    Vitals  No vitals data found for the desired time range.          Post Anesthesia Care and Evaluation    Patient location during evaluation: PACU  Patient participation: complete - patient participated  Level of consciousness: awake and responsive to verbal stimuli  Pain score: 2  Pain management: adequate  Airway patency: patent  Anesthetic complications: No anesthetic complications    Cardiovascular status: acceptable  Respiratory status: acceptable  Hydration status: acceptable    Comments: Pt awake and responsive. SV. VSS. Report to RN. Patient Vitals in the past 24 hrs:  07/02/20 0634, BP:120/57, Temp:97.4 °F (36.3 °C), Temp src:Temporal, Pulse:68, Resp:18, SpO2:96 %  133/78. p 72. r 16. t 98.1        152/74 sat 98 resp 16 temp 97.3 pulse 73

## 2020-07-02 NOTE — ANESTHESIA PROCEDURE NOTES
Airway  Urgency: elective    Date/Time: 7/2/2020 7:33 AM  Airway not difficult    General Information and Staff    Patient location during procedure: OR  CRNA: Nicholas Krishnan CRNA    Indications and Patient Condition  Indications for airway management: airway protection    Preoxygenated: yes  MILS not maintained throughout  Mask difficulty assessment: 1 - vent by mask    Final Airway Details  Final airway type: endotracheal airway      Successful airway: ETT  Cuffed: yes   Successful intubation technique: direct laryngoscopy  Facilitating devices/methods: intubating stylet  Endotracheal tube insertion site: oral  Blade: Precious  Blade size: 3  ETT size (mm): 7.5  Cormack-Lehane Classification: grade I - full view of glottis  Placement verified by: chest auscultation and capnometry   Measured from: lips  ETT/EBT  to lips (cm): 20  Number of attempts at approach: 1    Additional Comments  Negative epigastric sounds, Breath sound equal bilaterally with symmetric chest rise and fall

## 2020-07-02 NOTE — BRIEF OP NOTE
GASTRIC BANDING REMOVAL LAPAROSCOPIC  Progress Note    Jazmín Adams  7/2/2020    Pre-op Diagnosis:   Gastroesophageal reflux disease, esophagitis presence not specified [K21.9]  Dysphagia, unspecified type [R13.10]  Complication of gastric band procedure [K95.09]       Post-Op Diagnosis Codes:     * Gastroesophageal reflux disease, esophagitis presence not specified [K21.9]     * Dysphagia, unspecified type [R13.10]     * Complication of gastric band procedure [K95.09]    Procedure/CPT® Codes:  NC LAP, REMOVE ADJUST CATHIE RESTRICT DEVICE/PORT [81544]    Procedure(s):  GASTRIC BANDING REMOVAL LAPAROSCOPIC  Laparoscopic true cut liver biopsy    Surgeon(s):  Torsten Martinez MD    Anesthesia: General    Staff:   Circulator: Jewell Carias RN  Scrub Person: Julianne Sarmiento  Nursing Assistant: Marika Bey PCT    Estimated Blood Loss: 10 mL    Urine Voided: * No values recorded between 7/2/2020  7:28 AM and 7/2/2020  8:18 AM *    Specimens:                Specimens     ID Source Type Tests Collected By Collected At Frozen?      A Abdominal Wall Tissue · TISSUE PATHOLOGY EXAM   Torsten Martinez MD 7/2/20 0814      Description: EXPLANTED PORT AND LAP BAND    Comment: Explanted port and lap band    This specimen was not marked as sent.                Drains: * No LDAs found *    Findings:     Complications: none      Torsten Martinez MD     Date: 7/2/2020  Time: 08:18

## 2020-07-02 NOTE — ANESTHESIA PREPROCEDURE EVALUATION
Anesthesia Evaluation     Patient summary reviewed and Nursing notes reviewed   no history of anesthetic complications:  NPO Solid Status: > 8 hours  NPO Liquid Status: > 2 hours           Airway   Dental      Pulmonary    Cardiovascular     (+) hypertension, hyperlipidemia,       Neuro/Psych  (+) psychiatric history Anxiety and Depression,     (-) seizures, CVA  GI/Hepatic/Renal/Endo    (+) obesity,  GERD,  diabetes mellitus type 2, thyroid problem hypothyroidism    ROS Comment: Esophageal diverticulum.    Musculoskeletal     (+) back pain,   Abdominal    Substance History      OB/GYN          Other   arthritis,                      Anesthesia Plan    ASA 3     general with block   (Postinduction TAPs. )  intravenous induction     Anesthetic plan, all risks, benefits, and alternatives have been provided, discussed and informed consent has been obtained with: patient.    Plan discussed with CRNA.

## 2020-07-02 NOTE — OP NOTE
Preoperative diagnosis:   Intolerance of LAP-BAND with dysphasia status post lap band placement 12/08                                           Postoperative diagnosis:  Same, hepatomegaly     Procedure:   Laparoscopic Lap-Band and Port Removal\                       Laparoscopic true cut liver bx x 2                            Surgeon: Torsten Martinez MD                                      Anesth:  LARISA     EBL:  Min     Specimens:  Lap-Band and port, TC liver bx x 2     Drains:  None     Counts:  Correct     Complications:  None     Indications:  This is a 70-year-old morbidly obese white female status post laparoscopic LAP-BAND placement by Dr. Evans 12/08.   She presents now for elective laparoscopic LAP-BAND and port removal for intolerance, no slip or erosion on preop EGD/UGI.   Please see our office notes.  R/b/a rx were discussed and she wishes to proceed.     Operative Technique:   The patient was brought to the operating room and placed supine upon the operating room table, SCD hose were placed, she underwent uneventful general endotracheal anesthesia per the anesthesiology staff, she received IV ancef and subcutaneous lovenox, and her abdomen was prepped and draped with ChloraPrep in a sterile fashion, an Ioban was used.   The peritoneal cavity was entered in the upper abdomen to the left of midline using a 5 mm trocar and an Optiview technique and the abdomen was insufflated to a pressure of 15 mmHg with CO2 gas.  Exploratory laparoscopy revealed no evidence of injury from the entrance technique, an enlarged, somewhat nodular left lobe of the liver, band tubing in the left upper quadrant under some adhesions, no evidence of incisional hernia at the port site the LUQ.   Under direct visualization a 5 mm trocar was placed in the right upper quadrant.  The old port site was incised in the left midabdomen and through this incision medially an 11 mm trocar was placed and through this incision laterally a 5  mm trocar was placed.   The left lobe of the liver was elevated exposing the buckle.  This was cleaned off with cautery.  The buckle was unclasped and gently eviscerated from it's tunnel.  AP band.   There was no discoloration of the balloon to suggest erosion.   The band and its attached tubing were removed from the peritoneal cavity through the 11 mm trocar site and placed aside to be sent later with the entire specimen.  There was moderate capsule formation.  The capsule was debrided medially, taking care to avoid a gastrotomy.  This opened up things nicely.  Through a small stab incision in the epigastrium a true cut biopsy was obtained with an automatic 18 gauge true cut needle from the anterior surface of the left lobe of the liver x 2 - hemostasis achieved with cautery and specimens sent to pathology.      All trocars were removed under direct visualization, no bleeding noted from their sites.  Subcutaneous tissue in the port site incision was deepened with cautery down to the port.  It was a type I standard profile port, it was well incorporated, it was in the appropriate orientation, no evidence of infection.  Thick capsule formation.  The port was dissected free from its fascial attachments and removed with its attached tubing thereby removing the entire foreign body which was sent together as specimen.  All visible Ethibond suture was removed and the majority of the capsule debrided.  Fascia was infiltrated with local anesthetic.  Subcutaneous tissue was closed with an interrupted 2-0 Vicryl plus suture and skin in each of the 3 incisions was closed using 3-0 Monocryl plus in an interrupted subcuticular stitch followed by Skin Affix.  The patient tolerated the procedure well without complication and was taken to the recovery room in stable condition.

## 2020-07-09 LAB
CYTO UR: NORMAL
LAB AP CASE REPORT: NORMAL
LAB AP CLINICAL INFORMATION: NORMAL
PATH REPORT.FINAL DX SPEC: NORMAL
PATH REPORT.GROSS SPEC: NORMAL

## 2020-07-16 ENCOUNTER — OFFICE VISIT (OUTPATIENT)
Dept: BARIATRICS/WEIGHT MGMT | Facility: CLINIC | Age: 70
End: 2020-07-16

## 2020-07-16 VITALS
HEIGHT: 60 IN | DIASTOLIC BLOOD PRESSURE: 78 MMHG | TEMPERATURE: 97.1 F | RESPIRATION RATE: 18 BRPM | SYSTOLIC BLOOD PRESSURE: 132 MMHG | HEART RATE: 87 BPM | BODY MASS INDEX: 37.69 KG/M2 | OXYGEN SATURATION: 99 % | WEIGHT: 192 LBS

## 2020-07-16 DIAGNOSIS — Z98.84 STATUS POST BARIATRIC SURGERY: Primary | ICD-10-CM

## 2020-07-16 DIAGNOSIS — E66.9 OBESITY, CLASS II, BMI 35-39.9: ICD-10-CM

## 2020-07-16 DIAGNOSIS — K74.00 LIVER FIBROSIS: ICD-10-CM

## 2020-07-16 PROCEDURE — 99024 POSTOP FOLLOW-UP VISIT: CPT | Performed by: PHYSICIAN ASSISTANT

## 2020-07-16 NOTE — PROGRESS NOTES
[facility] BARIATRIC SURGERY  [address]  [facility] [address] [phone]    Jazmín TEENA Bryan.  1950    Day Of Visit: 7/16/2020    Reason for Visit:  Band removal Follow Up POD#14    HPI:    70 y.o. year old female s/p LAGB by Dr. Evans 12/2008 followed by AGBR removal/ liver biopsy by Dr. Martinez  on 7/2/20  forchronic dysphagia, hepatomegaly.       Doing well. Tolerating PO w/out issue. Symptoms are much improved, no longer with dysphagia, toelrating all foods well.  No longer has the abdominal port site pain, very relieved at this.  Denies fever, nausea, vomiting and abdominal pain. Bowels are moving. Voiding well. No other issues/concerns. Has a GI in [place] but prefers to see someone new with [facility].     Final Diagnosis   1. GROSS DIAGNOSIS ONLY:  Consistent with an explanted port and gastric banding system.  2. LIVER NEEDLE CORE BIOPSY:  Moderate steatosis with mild steatohepatitis.  Bridging fibrosis, consistent with stage 3 fibrosis.   Iron stains negative for stainable iron.  LED;DGD:/mbc:ecv          Past Medical History:   Diagnosis Date   • Anxiety and depression    • Arthritis    • Asthma     allergy induced   • Chronic back pain    • Dyspepsia    • Esophageal diverticulum     [facility] [place]   • Fatigue    • Fibromyalgia    • GERD (gastroesophageal reflux disease)    • History of petit-mal seizures     last 1-2020, on meds    • Hyperlipidemia    • Hypertension    • Hypothyroidism    • IBS (irritable bowel syndrome)    • Migraine    • Obesity    • RLS (restless legs syndrome)    • Type 2 diabetes mellitus (CMS/HCC)     diagnosed ~2005, checks fsbg 2x/day, last a1c ~2-2020 6.8   • Wears dentures    • Wears reading eyeglasses      Past Surgical History:   Procedure Laterality Date   • BLADDER SUSPENSION      x2   • CATARACT EXTRACTION Bilateral    • CHOLECYSTECTOMY OPEN     • COLONOSCOPY  2020   •  ENDOSCOPY  2020   • GASTRIC BANDING REMOVAL N/A 7/2/2020    Procedure: GASTRIC BANDING REMOVAL LAPAROSCOPIC WITH LIVER BIOPSY;  Surgeon: Torsten Martinez MD;  Location: [facility];  Service: Bariatric;  Laterality: N/A;   • LAPAROSCOPIC GASTRIC BANDING  2008    s/p LAGB APS 12/2008 w/ Dr. Evans   • LAPAROSCOPIC HYSTERECTOMY     • NEPHRECTOMY PARTIAL Left     due to stones    • PORTACATH PLACEMENT     • TUBAL ABDOMINAL LIGATION         Current Outpatient Medications:   •  albuterol sulfate  (90 Base) MCG/ACT inhaler, Inhale 2 puffs Every 4 (Four) Hours As Needed for Wheezing or Shortness of Air., Disp: , Rfl:   •  cyanocobalamin 1000 MCG/ML injection, 1,000 mcg Every 30 (Thirty) Days., Disp: , Rfl:   •  diclofenac (VOLTAREN) 1 % gel gel, 4 g 2 (Two) Times a Day As Needed (hips)., Disp: , Rfl:   •  EPINEPHrine (EPIPEN IJ), Inject 1 dose as directed As Needed (anaphylactic reaction)., Disp: , Rfl:   •  Fluticasone-Salmeterol (ADVAIR HFA IN), Inhale 1 puff Daily As Needed (allergy induced asthma, shortness of air or wheezing)., Disp: , Rfl:   •  gabapentin (NEURONTIN) 800 MG tablet, Take 800 mg by mouth 2 (Two) Times a Day., Disp: , Rfl:   •  JARDIANCE 25 MG tablet, 25 mg Daily., Disp: , Rfl:   •  levETIRAcetam (KEPPRA) 750 MG tablet, Take 750 mg by mouth 2 (Two) Times a Day., Disp: , Rfl:   •  levothyroxine (SYNTHROID, LEVOTHROID) 100 MCG tablet, Take 100 mcg by mouth Daily., Disp: , Rfl:   •  lisinopril-hydrochlorothiazide (PRINZIDE,ZESTORETIC) 10-12.5 MG per tablet, Take 1 tablet by mouth Every Night., Disp: , Rfl:   •  metFORMIN (GLUCOPHAGE) 500 MG tablet, Take 500 mg by mouth 2 (Two) Times a Day With Meals., Disp: , Rfl:   •  methocarbamol (ROBAXIN) 750 MG tablet, Take 750 mg by mouth 3 (Three) Times a Day As Needed for Muscle Spasms., Disp: , Rfl:   •  oxyCODONE-acetaminophen (PERCOCET)  MG per tablet, Take 1 tablet by mouth Every 6 (Six) Hours As Needed for Moderate Pain ., Disp: , Rfl:   •   "Riboflavin (VITAMIN B-2 PO), Take 1 tablet by mouth Daily As Needed (joint pain)., Disp: , Rfl:   •  simvastatin (ZOCOR) 40 MG tablet, Take 40 mg by mouth Every Night., Disp: , Rfl:   •  sucralfate (CARAFATE) 1 g tablet, Take 1 g by mouth Every Night., Disp: , Rfl:   Allergies   Allergen Reactions   • Bee Venom Anaphylaxis   • Sulfa Antibiotics Palpitations     Social History     Socioeconomic History   • Marital status:      Spouse name: Not on file   • Number of children: Not on file   • Years of education: Not on file   • Highest education level: Not on file   Tobacco Use   • Smoking status: Never Smoker   • Smokeless tobacco: Never Used   Substance and Sexual Activity   • Alcohol use: Never     Frequency: Never   • Drug use: Never   • Sexual activity: Defer     /78 (BP Location: Left arm, Patient Position: Sitting, Cuff Size: Adult)   Pulse 87   Temp 97.1 °F (36.2 °C) (Temporal)   Resp 18   Ht 152.4 cm (60\")   Wt 87.1 kg (192 lb)   SpO2 99%   BMI 37.50 kg/m²         The following portions of the patient's history were reviewed and updated as appropriate: allergies, current medications, past family history, past medical history, past social history, past surgical history and problem list.  Physical Exam   Constitutional: She is oriented to person, place, and time. She appears well-developed and well-nourished.   HENT:   Head: Normocephalic and atraumatic.   Cardiovascular: Normal rate and regular rhythm.   Pulmonary/Chest: Effort normal and breath sounds normal.   Abdominal: Soft. Bowel sounds are normal.   Incisions healing well   Neurological: She is alert and oriented to person, place, and time.   Skin: Skin is warm and dry.   Psychiatric: She has a normal mood and affect. Her behavior is normal. Judgment and thought content normal.       Assessment:   2 weeks s/p AGBR    ICD-10-CM ICD-9-CM   1. Status post bariatric surgery Z98.84 V45.86   2. Obesity, Class II, BMI 35-39.9 E66.9 278.00 "   3. Liver fibrosis K74.0 571.5       Plan:   Will refer to GI for liver fibrosis consult and send results to PCP, patient was given copy of path as well. Avoid HFCS. Follow high protein, low carb diet.   Call w/issues and concerns    Patient's Body mass index is 37.5 kg/m². BMI is above normal parameters. Recommendations include: exercise counseling and nutrition counseling.

## 2024-09-11 PROBLEM — E55.9 VITAMIN D DEFICIENCY: Status: ACTIVE | Noted: 2024-09-11

## 2024-09-11 PROBLEM — M79.7 FIBROMYALGIA: Status: ACTIVE | Noted: 2024-09-11

## 2024-09-17 PROBLEM — M25.50 ARTHRALGIA: Status: ACTIVE | Noted: 2024-09-17

## 2024-09-25 RX ORDER — FLUOXETINE 40 MG/1
40 CAPSULE ORAL EVERY MORNING
Qty: 30 CAPSULE | Refills: 1 | Status: SHIPPED | OUTPATIENT
Start: 2024-09-25

## 2024-10-29 NOTE — ASSESSMENT & PLAN NOTE
She has had degenerative changes of spine, knees, and hands.  First cmc joints bother her which is usually OA.  She does have 2nd and 3rd mcp swelling on left hand with tenderness to palpation.  9/21 Vitamin D 34,RF and CCP negative, ESR 60, ALT 58, .  X-ray of both hands 9/21 Generalized osteopenia.  There are minor degenerative changes of the DIP's and some PIP's in both hands.  There is no change since 2019  Oa by XR. Rheumatoid testing negative.  ESR and CRP intermittently elevated and thought to be related to her diverticular disease.    She is on gabapentin and oxycodone APAP per pain mgmt Dr. Saxena  Cool comfort splints for thumbs- [employer].  Avoid nsaids due to partial nephrectomy. This includes diclofenac/Voltaren gel.  Can use Biofreeze.   If she take Tylenol and Percocet she needs to calculate the amount of acetaminophen in both and make sure she is not going over the recommended limit.   Needs shoulder replacement but is putting this off.

## 2024-10-29 NOTE — PROGRESS NOTES
Office Visit       Date: 10/31/2024   Patient Name: Jazmín Adams  MRN: [id number]  YOB: 1950    Referring Physician: Provider, No Known     Chief Complaint:   Chief Complaint   Patient presents with    Follow-up       History of Present Illness: Jazmín Adams is a 74 y.o. female who is here today for follow-up of joint pain and fibromyalgia.    She has had two falls in the last 2 months. She sought medical attention for the first one and had no injuries. The second fall was about 6 weeks ago and she did not seek medical attention. No serious injuries or lingering pain that she is aware of.  She has chronic left shoulder pain and this is being followed by ortho Dr. Trinidad.  She had a diverticulitis episode 2 times and 2 UTIs last month.  She has had recurrent UTIs. She previously saw  urology but is now seeing urology at [place].  She continues to see Dr. Saxena for her FMS pain.     Subjective   Review of systems:  Positive for fatigue, hearing loss, postnasal drip, sinus pressure, sneezing, dysphagia, eye itching, dry eyes, shortness of breath, palpitations, abdominal pain, constipation, diarrhea, indigestion, nausea, vomiting, cold and heat intolerance, decreased libido, urinary frequency, urinary urgency, back pain, gait problems, neck pain and stiffness, dry skin, headaches, memory problem, numbness, weakness, decreased concentration, anxiety, sleep disturbance.  Otherwise negative ROS.    Past Medical History:   Past Medical History:   Diagnosis Date    Anxiety and depression     Arthralgia     Arthritis     Asthma     allergy induced    Chronic back pain     DDD (degenerative disc disease), lumbar     Diabetes mellitus     Diverticulitis     Dyspepsia     Esophageal diverticulum     [facility]    Esophageal diverticulum     Fatigue     Fatty liver     Fibromyalgia     GERD (gastroesophageal reflux disease)     History of petit-mal  seizures     last 1-2020, on meds     Hyperlipidemia     Hypertension     Hypothyroidism     IBS (irritable bowel syndrome)     Kidney stones     Migraine     Neuropathy     Obesity     Peptic ulcer disease     RLS (restless legs syndrome)     Seizures     Type 2 diabetes mellitus     diagnosed ~2005, checks fsbg 2x/day, last a1c ~2-2020 6.8    Vitamin D deficiency     Wears dentures     Wears reading eyeglasses        Past Surgical History:   Past Surgical History:   Procedure Laterality Date    BLADDER SUSPENSION      x2    CATARACT EXTRACTION Bilateral     CHOLECYSTECTOMY OPEN      COLONOSCOPY  2020    ENDOSCOPY  2020    GASTRIC BANDING REMOVAL N/A 7/2/2020    Procedure: GASTRIC BANDING REMOVAL LAPAROSCOPIC WITH LIVER BIOPSY;  Surgeon: Torsten Martinez MD;  Location: [facility];  Service: Bariatric;  Laterality: N/A;    LAPAROSCOPIC GASTRIC BANDING  2008    s/p LAGB APS 12/2008 w/ Dr. Evans    LAPAROSCOPIC HYSTERECTOMY      NEPHRECTOMY PARTIAL Left     due to stones     PORTACATH PLACEMENT      TUBAL ABDOMINAL LIGATION         Family History:   Family History   Problem Relation Age of Onset    Diabetes Father     Cancer Father        Social History:   Social History     Socioeconomic History    Marital status:    Tobacco Use    Smoking status: Never    Smokeless tobacco: Never   Vaping Use    Vaping status: Never Used   Substance and Sexual Activity    Alcohol use: Never    Drug use: Never    Sexual activity: Defer       Medications:   Current Outpatient Medications:     FLUoxetine (PROzac) 40 MG capsule, Take 1 capsule by mouth Every Morning., Disp: 30 capsule, Rfl: 5    glipizide (GLUCOTROL) 10 MG tablet, Take 1 tablet by mouth 2 (Two) Times a Day Before Meals., Disp: , Rfl:     methenamine (HIPREX) 1 g tablet, Take 1 tablet by mouth 2 (Two) Times a Day With Meals., Disp: , Rfl:     metroNIDAZOLE (FLAGYL) 500 MG tablet, Take 0.5 tablets by mouth As Needed., Disp: , Rfl:     rosuvastatin (CRESTOR) 40  MG tablet, Take 1 tablet by mouth Daily., Disp: , Rfl:     albuterol sulfate  (90 Base) MCG/ACT inhaler, Inhale 2 puffs Every 4 (Four) Hours As Needed for Wheezing or Shortness of Air., Disp: , Rfl:     aspirin 81 MG chewable tablet, Chew 1 tablet Daily., Disp: , Rfl:     budesonide-formoterol (SYMBICORT) 160-4.5 MCG/ACT inhaler, Inhale 2 puffs 2 (Two) Times a Day., Disp: , Rfl:     Cholecalciferol 25 MCG (1000 UT) tablet, Take 1 tablet by mouth Daily., Disp: , Rfl:     cyanocobalamin 1000 MCG/ML injection, 1,000 mcg Every 30 (Thirty) Days., Disp: , Rfl:     diclofenac (VOLTAREN) 1 % gel gel, 4 g 2 (Two) Times a Day As Needed (hips)., Disp: , Rfl:     dicyclomine (BENTYL) 20 MG tablet, Take 1 tablet by mouth Every 6 (Six) Hours., Disp: , Rfl:     Dulaglutide (Trulicity) 0.75 MG/0.5ML solution pen-injector, Inject  under the skin into the appropriate area as directed. inject (0.75MG)  by subcutaneous route  every week, Disp: , Rfl:     EPINEPHrine (EPIPEN IJ), Inject 1 dose as directed As Needed (anaphylactic reaction)., Disp: , Rfl:     estradiol (ESTRACE) 0.1 MG/GM vaginal cream, Insert 2 g into the vagina At Night As Needed., Disp: , Rfl:     Fluticasone-Salmeterol (ADVAIR HFA IN), Inhale 1 puff Daily As Needed (allergy induced asthma, shortness of air or wheezing)., Disp: , Rfl:     gabapentin (NEURONTIN) 800 MG tablet, Take 800 mg by mouth 2 (Two) Times a Day., Disp: , Rfl:     JARDIANCE 25 MG tablet, 25 mg Daily., Disp: , Rfl:     levETIRAcetam (KEPPRA) 750 MG tablet, Take 750 mg by mouth 2 (Two) Times a Day., Disp: , Rfl:     levocetirizine (XYZAL) 5 MG tablet, Take 1 tablet by mouth Daily., Disp: , Rfl:     levothyroxine (SYNTHROID, LEVOTHROID) 100 MCG tablet, Take 100 mcg by mouth Daily., Disp: , Rfl:     lisinopril-hydrochlorothiazide (PRINZIDE,ZESTORETIC) 10-12.5 MG per tablet, Take 1 tablet by mouth Every Night., Disp: , Rfl:     montelukast (SINGULAIR) 10 MG tablet, Take 1 tablet by mouth Every  "Night., Disp: , Rfl:     multivitamin (MULTIVITAMIN PO), Take  by mouth., Disp: , Rfl:     naloxone (NARCAN) 4 MG/0.1ML nasal spray, Administer 1 spray into the nostril(s) as directed by provider As Needed., Disp: , Rfl:     ondansetron (ZOFRAN) 8 MG tablet, Take 1 tablet by mouth Every 12 (Twelve) Hours As Needed., Disp: , Rfl:     oxyCODONE-acetaminophen (PERCOCET)  MG per tablet, Take 1 tablet by mouth Every 6 (Six) Hours As Needed for Moderate Pain ., Disp: , Rfl:     pantoprazole (PROTONIX) 40 MG EC tablet, Take 1 tablet by mouth Daily., Disp: , Rfl:     Riboflavin (VITAMIN B-2 PO), Take 1 tablet by mouth Daily As Needed (joint pain)., Disp: , Rfl:     sucralfate (CARAFATE) 1 g tablet, Take 1 g by mouth Every Night., Disp: , Rfl:     tiZANidine (ZANAFLEX) 2 MG tablet, Take 1/2 to 1 tablet by mouth 3 times a day as needed, Disp: 90 tablet, Rfl: 5    Tolterodine Tartrate (DETROL PO), Take 1 tablet by mouth Daily., Disp: , Rfl:     Allergies:   Allergies   Allergen Reactions    Bee Venom Anaphylaxis    Nitroglycerin Provider Review Needed    Sulfa Antibiotics Palpitations       I reviewed the patient's chief complaint, history of present illness, review of systems, past medical history, surgical history, family history, social history, medications and allergy list.     Objective    Vital Signs:   Vitals:    10/31/24 1431   BP: 142/84   BP Location: Left arm   Pulse: 68   Temp: 97.1 °F (36.2 °C)   Weight: 80.3 kg (177 lb)   Height: 152.4 cm (60\")   PainSc:   7   PainLoc: Arm  Comment: Left     Body mass index is 34.57 kg/m².   BMI cannot be calculated due to outdated height or weight values.  Please input a current height/weight in Vitals and re-renter BMIFOLLOWUP in Note to pull in correct documentation based on BMI range.       Physical Exam:  Physical Exam  Constitutional:       Appearance: Normal appearance. She is obese.   HENT:      Head: Normocephalic and atraumatic.   Eyes:      Conjunctiva/sclera: " Conjunctivae normal.      Pupils: Pupils are equal, round, and reactive to light.   Cardiovascular:      Rate and Rhythm: Normal rate and regular rhythm.      Heart sounds: Normal heart sounds.   Pulmonary:      Effort: Pulmonary effort is normal.      Breath sounds: Normal breath sounds.   Musculoskeletal:         General: Normal range of motion.      Cervical back: Normal range of motion.      Comments: First cmc joints tender.  H/o 2nd and 3rd mcp work injury.  Crepitus of the left knee.  Tender ROM L shoulder  OA changes hands  Myofascial tenderness present   Skin:     General: Skin is warm and dry.   Neurological:      General: No focal deficit present.      Mental Status: She is alert and oriented to person, place, and time.   Psychiatric:         Mood and Affect: Mood normal.         Behavior: Behavior normal.         Thought Content: Thought content normal.         Judgment: Judgment normal.            Assessment / Plan    Assessment/Plan:   Diagnoses and all orders for this visit:    1. Fibromyalgia (Primary)  Assessment & Plan:  On gabapentin/oxycodone per Dr. Saxena  She historically felt drugged on Lyrica/Cymbalta.  Prozac seems to help her  Sleep study negative.  Chronic diffuse muscle pain.  5/22 CRP normal , ESR 41 (40) in 5/22  Robaxin not helpful.    Continue pain treatment with Dr. Saxena.  Continue heat, ice, massage.  Continue Proza 40 mg daily.  Water aerobics and water walking recommended but she does not have access.  Chair aerobics or yoga on YouTube.  Avoids nsaids with hx of partial nephrectomy.  Can try Biofreeze, cbd lotion.  Continue tizanidine 1-2 mg tid for spasm and muscle pain.   Labs 9/29/24 stable  RTC 6 months    Orders:  -     FLUoxetine (PROzac) 40 MG capsule; Take 1 capsule by mouth Every Morning.  Dispense: 30 capsule; Refill: 5  -     tiZANidine (ZANAFLEX) 2 MG tablet; Take 1/2 to 1 tablet by mouth 3 times a day as needed  Dispense: 90 tablet; Refill: 5    2. Arthralgia,  unspecified joint  Assessment & Plan:  She has had degenerative changes of spine, knees, and hands.  First cmc joints bother her which is usually OA.  She does have 2nd and 3rd mcp swelling on left hand with tenderness to palpation.  9/21 Vitamin D 34,RF and CCP negative, ESR 60, ALT 58, .  X-ray of both hands 9/21 Generalized osteopenia.  There are minor degenerative changes of the DIP's and some PIP's in both hands.  There is no change since 2019  Oa by XR. Rheumatoid testing negative.  ESR and CRP intermittently elevated and thought to be related to her diverticular disease.    She is on gabapentin and oxycodone APAP per pain mgmt Dr. Saxena  Cool comfort splints for thumbs- [employer].  Avoid nsaids due to partial nephrectomy. This includes diclofenac/Voltaren gel.  Can use Biofreeze.   If she take Tylenol and Percocet she needs to calculate the amount of acetaminophen in both and make sure she is not going over the recommended limit.   Needs shoulder replacement but is putting this off.    Orders:  -     FLUoxetine (PROzac) 40 MG capsule; Take 1 capsule by mouth Every Morning.  Dispense: 30 capsule; Refill: 5  -     tiZANidine (ZANAFLEX) 2 MG tablet; Take 1/2 to 1 tablet by mouth 3 times a day as needed  Dispense: 90 tablet; Refill: 5    3. Rotator cuff arthropathy of left shoulder  Assessment & Plan:  Following with ortho Dr. Trinidad  Was considering surgery          Follow Up:   Return in about 6 months (around 4/30/2025) for Dr. Pizano.        MARY Doll   [facility] [place]    yes...

## 2024-10-29 NOTE — ASSESSMENT & PLAN NOTE
On gabapentin/oxycodone per Dr. Saxena  She historically felt drugged on Lyrica/Cymbalta.  Prozac seems to help her  Sleep study negative.  Chronic diffuse muscle pain.  5/22 CRP normal , ESR 41 (40) in 5/22  Robaxin not helpful.    Continue pain treatment with Dr. Saxena.  Continue heat, ice, massage.  Continue Proza 40 mg daily.  Water aerobics and water walking recommended but she does not have access.  Chair aerobics or yoga on YouTube.  Avoids nsaids with hx of partial nephrectomy.  Can try Biofreeze, cbd lotion.  Continue tizanidine 1-2 mg tid for spasm and muscle pain.   Labs 9/29/24 stable  RTC 6 months

## 2024-10-31 ENCOUNTER — OFFICE VISIT (OUTPATIENT)
Age: 74
End: 2024-10-31
Payer: MEDICARE

## 2024-10-31 VITALS
DIASTOLIC BLOOD PRESSURE: 84 MMHG | SYSTOLIC BLOOD PRESSURE: 142 MMHG | HEART RATE: 68 BPM | TEMPERATURE: 97.1 F | BODY MASS INDEX: 34.75 KG/M2 | WEIGHT: 177 LBS | HEIGHT: 60 IN

## 2024-10-31 DIAGNOSIS — M12.812 ROTATOR CUFF ARTHROPATHY OF LEFT SHOULDER: ICD-10-CM

## 2024-10-31 DIAGNOSIS — M79.7 FIBROMYALGIA: Primary | ICD-10-CM

## 2024-10-31 DIAGNOSIS — M25.50 ARTHRALGIA, UNSPECIFIED JOINT: ICD-10-CM

## 2024-10-31 PROBLEM — M81.0 AGE RELATED OSTEOPOROSIS: Status: ACTIVE | Noted: 2017-09-28

## 2024-10-31 PROBLEM — Z90.5 SOLITARY KIDNEY, ACQUIRED: Status: ACTIVE | Noted: 2024-10-31

## 2024-10-31 PROBLEM — I51.7 CARDIOMEGALY: Status: ACTIVE | Noted: 2024-10-31

## 2024-10-31 PROBLEM — J45.909 ASTHMA: Status: ACTIVE | Noted: 2024-04-29

## 2024-10-31 PROBLEM — K57.92 DIVERTICULITIS: Status: ACTIVE | Noted: 2024-02-01

## 2024-10-31 PROBLEM — N39.0 RECURRENT UTI: Status: ACTIVE | Noted: 2023-11-05

## 2024-10-31 PROBLEM — F41.9 CHRONIC ANXIETY: Status: ACTIVE | Noted: 2024-04-29

## 2024-10-31 PROBLEM — Z98.84 PERSONAL HISTORY OF GASTRIC BANDING: Chronic | Status: ACTIVE | Noted: 2022-11-15

## 2024-10-31 PROBLEM — G45.9 TIA (TRANSIENT ISCHEMIC ATTACK): Status: ACTIVE | Noted: 2022-10-01

## 2024-10-31 PROBLEM — I20.0 UNSTABLE ANGINA: Status: ACTIVE | Noted: 2022-11-15

## 2024-10-31 PROBLEM — R56.9 SEIZURE: Chronic | Status: ACTIVE | Noted: 2020-01-16

## 2024-10-31 RX ORDER — GLIPIZIDE 10 MG/1
10 TABLET ORAL
COMMUNITY
Start: 2024-10-23

## 2024-10-31 RX ORDER — ROSUVASTATIN CALCIUM 40 MG/1
40 TABLET, COATED ORAL DAILY
COMMUNITY
Start: 2024-07-23

## 2024-10-31 RX ORDER — ONDANSETRON 8 MG/1
8 TABLET, FILM COATED ORAL EVERY 12 HOURS PRN
COMMUNITY

## 2024-10-31 RX ORDER — LEVOCETIRIZINE DIHYDROCHLORIDE 5 MG/1
5 TABLET, FILM COATED ORAL DAILY
COMMUNITY

## 2024-10-31 RX ORDER — ESTRADIOL 0.1 MG/G
2 CREAM VAGINAL NIGHTLY PRN
COMMUNITY

## 2024-10-31 RX ORDER — METHENAMINE HIPPURATE 1000 MG/1
1 TABLET ORAL 2 TIMES DAILY WITH MEALS
COMMUNITY
Start: 2024-02-15

## 2024-10-31 RX ORDER — ASPIRIN 81 MG/1
81 TABLET, CHEWABLE ORAL DAILY
COMMUNITY

## 2024-10-31 RX ORDER — MONTELUKAST SODIUM 10 MG/1
10 TABLET ORAL NIGHTLY
COMMUNITY

## 2024-10-31 RX ORDER — METRONIDAZOLE 500 MG/1
250 TABLET ORAL AS NEEDED
COMMUNITY
Start: 2024-09-29

## 2024-10-31 RX ORDER — PANTOPRAZOLE SODIUM 40 MG/1
40 TABLET, DELAYED RELEASE ORAL DAILY
COMMUNITY

## 2024-10-31 RX ORDER — DICYCLOMINE HCL 20 MG
20 TABLET ORAL EVERY 6 HOURS
COMMUNITY

## 2024-10-31 RX ORDER — FLUOXETINE 40 MG/1
40 CAPSULE ORAL EVERY MORNING
Qty: 30 CAPSULE | Refills: 5 | Status: SHIPPED | OUTPATIENT
Start: 2024-10-31

## 2024-10-31 RX ORDER — TIZANIDINE 2 MG/1
TABLET ORAL
Qty: 90 TABLET | Refills: 5 | Status: SHIPPED | OUTPATIENT
Start: 2024-10-31

## 2025-03-03 ENCOUNTER — TELEPHONE (OUTPATIENT)
Age: 75
End: 2025-03-03
Payer: MEDICARE

## 2025-03-03 NOTE — TELEPHONE ENCOUNTER
PT APPT HAD TO BE CANCELLED. IF PT CALLS BACK TO RESCHEDULE, PLEASE SCHEDULE WITH FLOR JAQUEZ OR [facility]. IF NOT ALREADY ON, PLEASE ADD PT TO THE CANCELLATION LIST AS NORMAL PRIOTIRY WITH AN END DATE OF 12/31/2025.   -HUB READY TO SCHEDULE.

## 2025-06-09 ENCOUNTER — TELEPHONE (OUTPATIENT)
Age: 75
End: 2025-06-09
Payer: MEDICARE

## 2025-06-09 DIAGNOSIS — M25.50 ARTHRALGIA, UNSPECIFIED JOINT: ICD-10-CM

## 2025-06-09 DIAGNOSIS — M79.7 FIBROMYALGIA: ICD-10-CM

## 2025-06-09 RX ORDER — FLUOXETINE HYDROCHLORIDE 40 MG/1
40 CAPSULE ORAL EVERY MORNING
Qty: 30 CAPSULE | Refills: 0 | Status: SHIPPED | OUTPATIENT
Start: 2025-06-09

## 2025-06-09 NOTE — TELEPHONE ENCOUNTER
File Link    Scan on 6/7/2025 0642 by [employer], [place]: MOLLYOGER,FLUOXETINE,6/7/25        Key Information    Document ID File Type Document Type Description   [id number] Image MEDICATIONS - SCAN KROGER,FLUOXETINE,6/7/25     Import Information    Attached At Date Time User Dept   Encounter Level 6/7/2025  6:42 AM [employer], [place]      Encounter    Scanned Document on 6/7/25 with [employer], [place]

## 2025-07-21 NOTE — ASSESSMENT & PLAN NOTE
She has had degenerative changes of spine, knees, and hands.  First cmc joints bother her which is usually OA.  She does have 2nd and 3rd mcp swelling on left hand with tenderness to palpation.  9/21 Vitamin D 34,RF and CCP negative, ESR 60, ALT 58, .  X-ray of both hands 9/21 Generalized osteopenia.  There are minor degenerative changes of the DIP's and some PIP's in both hands.  There is no change since 2019  Oa by XR. Rheumatoid testing negative.  ESR and CRP intermittently elevated and thought to be related to her diverticular disease.    She is on gabapentin and oxycodone APAP per pain mgmt Dr. Saxena  Cool comfort splints for thumbs- [employer].  Avoid nsaids due to partial nephrectomy. This includes diclofenac/Voltaren gel.  Can use Biofreeze.   If she take Tylenol and Percocet she needs to calculate the amount of acetaminophen in both and make sure she is not going over the recommended limit.   Needs shoulder replacement but is putting this off.  Can trial compression gloves  Orders:    tiZANidine (ZANAFLEX) 2 MG tablet; Take 1/2 to 1 tablet by mouth 3 times a day as needed

## 2025-07-21 NOTE — ASSESSMENT & PLAN NOTE
On gabapentin/oxycodone per Dr. Saxena  She historically felt drugged on Lyrica/Cymbalta.  Prozac seems to help her  Sleep study negative.  Chronic diffuse muscle pain.  5/22 CRP normal , ESR 41 (40) in 5/22  Robaxin not helpful.    Continue pain treatment with Dr. Saxena.  Oxycodone makes her itch.  She is scared to change to morphine.  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  I encouraged her to talk about this with Dr. Saxena.  Continue heat, ice, massage.  Her cardiologist stopped her Prozac.  There was concern that it would affect her heart.  She can speak to cardiology to see what anti-depressant they would recommend.  We can prescribe or PCP can prescribe.  She did feel Prozac was helpful.  Water aerobics and water walking recommended but she does not have access.  Chair aerobics or yoga on YouTube.  Avoids nsaids with hx of partial nephrectomy.  Can try Biofreeze, cbd lotion.  Continue tizanidine 1-2 mg tid for spasm and muscle pain.   Recent labs stable other than glucose elevated at 328 and AST elevated at 45.  RTC 6 months  Orders:    tiZANidine (ZANAFLEX) 2 MG tablet; Take 1/2 to 1 tablet by mouth 3 times a day as needed

## 2025-07-22 ENCOUNTER — OFFICE VISIT (OUTPATIENT)
Age: 75
End: 2025-07-22
Payer: MEDICARE

## 2025-07-22 VITALS
SYSTOLIC BLOOD PRESSURE: 118 MMHG | HEIGHT: 60 IN | BODY MASS INDEX: 35.3 KG/M2 | DIASTOLIC BLOOD PRESSURE: 76 MMHG | TEMPERATURE: 97 F | HEART RATE: 77 BPM | WEIGHT: 179.8 LBS

## 2025-07-22 DIAGNOSIS — M12.812 ROTATOR CUFF ARTHROPATHY OF LEFT SHOULDER: ICD-10-CM

## 2025-07-22 DIAGNOSIS — M25.50 ARTHRALGIA, UNSPECIFIED JOINT: ICD-10-CM

## 2025-07-22 DIAGNOSIS — M79.7 FIBROMYALGIA: Primary | ICD-10-CM

## 2025-07-22 PROCEDURE — G2211 COMPLEX E/M VISIT ADD ON: HCPCS | Performed by: NURSE PRACTITIONER

## 2025-07-22 PROCEDURE — 3074F SYST BP LT 130 MM HG: CPT | Performed by: NURSE PRACTITIONER

## 2025-07-22 PROCEDURE — 99214 OFFICE O/P EST MOD 30 MIN: CPT | Performed by: NURSE PRACTITIONER

## 2025-07-22 PROCEDURE — 3078F DIAST BP <80 MM HG: CPT | Performed by: NURSE PRACTITIONER

## 2025-07-22 RX ORDER — FLUTICASONE PROPIONATE 50 MCG
SPRAY, SUSPENSION (ML) NASAL
COMMUNITY
Start: 2025-05-16

## 2025-07-22 RX ORDER — NYSTATIN 100000 [USP'U]/G
POWDER TOPICAL
COMMUNITY

## 2025-07-22 RX ORDER — TIZANIDINE 2 MG/1
TABLET ORAL
Qty: 90 TABLET | Refills: 5 | Status: SHIPPED | OUTPATIENT
Start: 2025-07-22

## 2025-07-22 RX ORDER — CARVEDILOL 3.12 MG/1
3.12 TABLET ORAL 2 TIMES DAILY
COMMUNITY

## 2025-07-22 RX ORDER — FLUCONAZOLE 100 MG/1
TABLET ORAL AS NEEDED
COMMUNITY
Start: 2025-04-08

## 2025-07-22 RX ORDER — OMEPRAZOLE 40 MG/1
CAPSULE, DELAYED RELEASE ORAL
COMMUNITY
Start: 2025-06-02

## 2025-07-22 RX ORDER — SEMAGLUTIDE 2.68 MG/ML
INJECTION, SOLUTION SUBCUTANEOUS
COMMUNITY

## 2025-07-22 RX ORDER — LANOLIN ALCOHOL/MO/W.PET/CERES
400 CREAM (GRAM) TOPICAL DAILY
COMMUNITY

## 2025-07-22 RX ORDER — FLUTICASONE FUROATE, UMECLIDINIUM BROMIDE AND VILANTEROL TRIFENATATE 100; 62.5; 25 UG/1; UG/1; UG/1
POWDER RESPIRATORY (INHALATION)
COMMUNITY
Start: 2025-07-08

## 2025-07-22 RX ORDER — NYSTATIN 100000 U/G
OINTMENT TOPICAL
COMMUNITY

## (undated) DEVICE — GLV SURG SENSICARE PI MIC PF SZ8.5 LF STRL

## (undated) DEVICE — ENDOPATH XCEL UNIVERSAL TROCAR STABLILITY SLEEVES: Brand: ENDOPATH XCEL

## (undated) DEVICE — GLV SURG SENSICARE PI MIC PF SZ6 LF STRL

## (undated) DEVICE — SHT AIR TRANSFR COMFRT GLIDE LT LAT 40X80IN

## (undated) DEVICE — ENDOPATH XCEL BLADELESS TROCARS WITH STABILITY SLEEVES: Brand: ENDOPATH XCEL

## (undated) DEVICE — SUT MNCRYL 3/0 Y936H

## (undated) DEVICE — SYR CONTRL LUERLOK 10CC

## (undated) DEVICE — PENCL E/S HNDSWCH ROCKRBTN HOLSTR 10FT

## (undated) DEVICE — PK BARIATRIC 10

## (undated) DEVICE — DRSNG TELFA PAD NONADH STR 1S 3X8IN

## (undated) DEVICE — NDL HYPO ECLPS SFTY 22G 1 1/2IN

## (undated) DEVICE — GOWN,NON-REINFORCED,SIRUS,SET IN SLV,XXL: Brand: MEDLINE

## (undated) DEVICE — ELECTRD BLD EZ CLN STD 2.5IN

## (undated) DEVICE — COVER,LIGHT HANDLE,FLX,1/PK: Brand: MEDLINE INDUSTRIES, INC.

## (undated) DEVICE — SKIN AFFIX SURG ADHESIVE 72/CS 0.55ML: Brand: MEDLINE